# Patient Record
Sex: FEMALE | Race: OTHER | HISPANIC OR LATINO
[De-identification: names, ages, dates, MRNs, and addresses within clinical notes are randomized per-mention and may not be internally consistent; named-entity substitution may affect disease eponyms.]

---

## 2017-02-12 ENCOUNTER — FORM ENCOUNTER (OUTPATIENT)
Age: 52
End: 2017-02-12

## 2017-11-14 PROBLEM — Z00.00 ENCOUNTER FOR PREVENTIVE HEALTH EXAMINATION: Status: ACTIVE | Noted: 2017-11-14

## 2017-11-28 ENCOUNTER — APPOINTMENT (OUTPATIENT)
Dept: ORTHOPEDIC SURGERY | Facility: CLINIC | Age: 52
End: 2017-11-28

## 2017-12-11 ENCOUNTER — APPOINTMENT (OUTPATIENT)
Dept: ORTHOPEDIC SURGERY | Facility: CLINIC | Age: 52
End: 2017-12-11

## 2017-12-11 ENCOUNTER — APPOINTMENT (OUTPATIENT)
Dept: ORTHOPEDIC SURGERY | Facility: CLINIC | Age: 52
End: 2017-12-11
Payer: COMMERCIAL

## 2017-12-11 VITALS — WEIGHT: 150 LBS | RESPIRATION RATE: 16 BRPM | BODY MASS INDEX: 25.61 KG/M2 | HEIGHT: 64 IN

## 2017-12-11 PROCEDURE — 76882 US LMTD JT/FCL EVL NVASC XTR: CPT | Mod: RT

## 2017-12-11 PROCEDURE — 99203 OFFICE O/P NEW LOW 30 MIN: CPT

## 2017-12-11 PROCEDURE — 73110 X-RAY EXAM OF WRIST: CPT | Mod: RT

## 2018-02-15 ENCOUNTER — FORM ENCOUNTER (OUTPATIENT)
Age: 53
End: 2018-02-15

## 2018-07-23 ENCOUNTER — APPOINTMENT (OUTPATIENT)
Dept: ORTHOPEDIC SURGERY | Facility: CLINIC | Age: 53
End: 2018-07-23
Payer: COMMERCIAL

## 2018-07-23 VITALS — BODY MASS INDEX: 25.61 KG/M2 | RESPIRATION RATE: 16 BRPM | HEIGHT: 64 IN | WEIGHT: 150 LBS

## 2018-07-23 PROCEDURE — 73070 X-RAY EXAM OF ELBOW: CPT | Mod: RT

## 2018-07-23 PROCEDURE — 20605 DRAIN/INJ JOINT/BURSA W/O US: CPT | Mod: RT

## 2018-07-23 PROCEDURE — 99214 OFFICE O/P EST MOD 30 MIN: CPT | Mod: 25

## 2018-09-08 ENCOUNTER — EMERGENCY (EMERGENCY)
Facility: HOSPITAL | Age: 53
LOS: 1 days | Discharge: HOME | End: 2018-09-08
Attending: EMERGENCY MEDICINE | Admitting: EMERGENCY MEDICINE
Payer: COMMERCIAL

## 2018-09-08 VITALS
TEMPERATURE: 98 F | OXYGEN SATURATION: 99 % | HEART RATE: 72 BPM | SYSTOLIC BLOOD PRESSURE: 138 MMHG | DIASTOLIC BLOOD PRESSURE: 83 MMHG | WEIGHT: 153.66 LBS | HEIGHT: 64 IN | RESPIRATION RATE: 18 BRPM

## 2018-09-08 VITALS
OXYGEN SATURATION: 98 % | RESPIRATION RATE: 18 BRPM | DIASTOLIC BLOOD PRESSURE: 68 MMHG | TEMPERATURE: 98 F | SYSTOLIC BLOOD PRESSURE: 101 MMHG | HEART RATE: 79 BPM

## 2018-09-08 LAB
ANION GAP SERPL CALC-SCNC: 15 MMOL/L — SIGNIFICANT CHANGE UP (ref 5–17)
BASOPHILS NFR BLD AUTO: 0.2 % — SIGNIFICANT CHANGE UP (ref 0–2)
BUN SERPL-MCNC: 12 MG/DL — SIGNIFICANT CHANGE UP (ref 7–23)
CALCIUM SERPL-MCNC: 9.9 MG/DL — SIGNIFICANT CHANGE UP (ref 8.4–10.5)
CHLORIDE SERPL-SCNC: 102 MMOL/L — SIGNIFICANT CHANGE UP (ref 96–108)
CO2 SERPL-SCNC: 24 MMOL/L — SIGNIFICANT CHANGE UP (ref 22–31)
CREAT SERPL-MCNC: 0.9 MG/DL — SIGNIFICANT CHANGE UP (ref 0.5–1.3)
GLUCOSE SERPL-MCNC: 103 MG/DL — HIGH (ref 70–99)
HCT VFR BLD CALC: 42.9 % — SIGNIFICANT CHANGE UP (ref 34.5–45)
HGB BLD-MCNC: 14.6 G/DL — SIGNIFICANT CHANGE UP (ref 11.5–15.5)
LYMPHOCYTES # BLD AUTO: 16.7 % — SIGNIFICANT CHANGE UP (ref 13–44)
MCHC RBC-ENTMCNC: 30.3 PG — SIGNIFICANT CHANGE UP (ref 27–34)
MCHC RBC-ENTMCNC: 34 G/DL — SIGNIFICANT CHANGE UP (ref 32–36)
MCV RBC AUTO: 89 FL — SIGNIFICANT CHANGE UP (ref 80–100)
MONOCYTES NFR BLD AUTO: 5.4 % — SIGNIFICANT CHANGE UP (ref 2–14)
NEUTROPHILS NFR BLD AUTO: 77.7 % — HIGH (ref 43–77)
PLATELET # BLD AUTO: 247 K/UL — SIGNIFICANT CHANGE UP (ref 150–400)
POTASSIUM SERPL-MCNC: 3.9 MMOL/L — SIGNIFICANT CHANGE UP (ref 3.5–5.3)
POTASSIUM SERPL-SCNC: 3.9 MMOL/L — SIGNIFICANT CHANGE UP (ref 3.5–5.3)
RBC # BLD: 4.82 M/UL — SIGNIFICANT CHANGE UP (ref 3.8–5.2)
RBC # FLD: 13.2 % — SIGNIFICANT CHANGE UP (ref 10.3–16.9)
SODIUM SERPL-SCNC: 141 MMOL/L — SIGNIFICANT CHANGE UP (ref 135–145)
TROPONIN T SERPL-MCNC: <0.01 NG/ML — SIGNIFICANT CHANGE UP (ref 0–0.01)
WBC # BLD: 5.6 K/UL — SIGNIFICANT CHANGE UP (ref 3.8–10.5)
WBC # FLD AUTO: 5.6 K/UL — SIGNIFICANT CHANGE UP (ref 3.8–10.5)

## 2018-09-08 PROCEDURE — 82962 GLUCOSE BLOOD TEST: CPT

## 2018-09-08 PROCEDURE — 84484 ASSAY OF TROPONIN QUANT: CPT

## 2018-09-08 PROCEDURE — 93010 ELECTROCARDIOGRAM REPORT: CPT

## 2018-09-08 PROCEDURE — 85025 COMPLETE CBC W/AUTO DIFF WBC: CPT

## 2018-09-08 PROCEDURE — 96374 THER/PROPH/DIAG INJ IV PUSH: CPT

## 2018-09-08 PROCEDURE — 93005 ELECTROCARDIOGRAM TRACING: CPT

## 2018-09-08 PROCEDURE — 80048 BASIC METABOLIC PNL TOTAL CA: CPT

## 2018-09-08 PROCEDURE — 99284 EMERGENCY DEPT VISIT MOD MDM: CPT | Mod: 25

## 2018-09-08 PROCEDURE — 96361 HYDRATE IV INFUSION ADD-ON: CPT

## 2018-09-08 PROCEDURE — 96375 TX/PRO/DX INJ NEW DRUG ADDON: CPT

## 2018-09-08 RX ORDER — METOCLOPRAMIDE HCL 10 MG
10 TABLET ORAL ONCE
Qty: 0 | Refills: 0 | Status: COMPLETED | OUTPATIENT
Start: 2018-09-08 | End: 2018-09-08

## 2018-09-08 RX ORDER — KETOROLAC TROMETHAMINE 30 MG/ML
15 SYRINGE (ML) INJECTION ONCE
Qty: 0 | Refills: 0 | Status: DISCONTINUED | OUTPATIENT
Start: 2018-09-08 | End: 2018-09-08

## 2018-09-08 RX ORDER — SODIUM CHLORIDE 9 MG/ML
1000 INJECTION INTRAMUSCULAR; INTRAVENOUS; SUBCUTANEOUS ONCE
Qty: 0 | Refills: 0 | Status: COMPLETED | OUTPATIENT
Start: 2018-09-08 | End: 2018-09-08

## 2018-09-08 RX ORDER — ONDANSETRON 8 MG/1
1 TABLET, FILM COATED ORAL
Qty: 10 | Refills: 0 | OUTPATIENT
Start: 2018-09-08

## 2018-09-08 RX ORDER — DIPHENHYDRAMINE HCL 50 MG
25 CAPSULE ORAL ONCE
Qty: 0 | Refills: 0 | Status: COMPLETED | OUTPATIENT
Start: 2018-09-08 | End: 2018-09-08

## 2018-09-08 RX ORDER — MECLIZINE HCL 12.5 MG
1 TABLET ORAL
Qty: 20 | Refills: 0 | OUTPATIENT
Start: 2018-09-08

## 2018-09-08 RX ADMIN — SODIUM CHLORIDE 1000 MILLILITER(S): 9 INJECTION INTRAMUSCULAR; INTRAVENOUS; SUBCUTANEOUS at 18:00

## 2018-09-08 RX ADMIN — Medication 10 MILLIGRAM(S): at 16:51

## 2018-09-08 RX ADMIN — Medication 25 MILLIGRAM(S): at 16:50

## 2018-09-08 RX ADMIN — Medication 15 MILLIGRAM(S): at 17:59

## 2018-09-08 RX ADMIN — SODIUM CHLORIDE 2000 MILLILITER(S): 9 INJECTION INTRAMUSCULAR; INTRAVENOUS; SUBCUTANEOUS at 16:50

## 2018-09-08 RX ADMIN — Medication 15 MILLIGRAM(S): at 16:50

## 2018-09-08 NOTE — ED PROVIDER NOTE - MEDICAL DECISION MAKING DETAILS
suspect bppv, possible migraine.  no focal neuro findings except for lateral nystagmus.  labs wnl and neg for elevtrolyte imbalance/ acs.   given reglan/toradol/benadryl/ ivf with resolution of symptoms.  home with meclizine suspect bppv, possible migraine.  no focal neuro findings except for lateral nystagmus.  labs wnl and neg for electrolyte imbalance/ acs.   given reglan/toradol/benadryl/ ivf with resolution of symptoms.  home with meclizine

## 2018-09-08 NOTE — ED PROVIDER NOTE - ENMT, MLM
Airway patent, Nasal mucosa clear. Mouth with normal mucosa. Throat has no vesicles, no oropharyngeal exudates and uvula is midline.  Supple.  Tm normal

## 2018-09-08 NOTE — ED ADULT TRIAGE NOTE - CHIEF COMPLAINT QUOTE
Pt CO Dizziness x1 week with vomiting starting since Wednesday.  EKG and FSBG in progress.  PT denies SOB, Fevers, CP

## 2018-09-08 NOTE — ED PROVIDER NOTE - EYES, MLM
Clear bilaterally, pupils equal, round and reactive to light.  lateral nystagmus when looking to right

## 2018-09-08 NOTE — ED PROVIDER NOTE - OBJECTIVE STATEMENT
here with intermittent dizziness/ nausea/ vomiting associated with mild headache for past several days.  Says it feels like things are spinning/ moving around.  Tried dramamine without relief.  No fever/chills, chest pain, sob, urinary symptoms, abdominal pain.  Remembers something similar a few years ago.

## 2018-09-08 NOTE — ED ADULT NURSE REASSESSMENT NOTE - NS ED NURSE REASSESS COMMENT FT1
Patient a/oX 3, anxious, c/o of dizziness w/ nausea and headache, no vomitting since arrival to ED.  EKG NSR, vital signs stable.  Right AC PIV #20 in place, all labs sent, no complications.  Administered NSS 1 L bolus, REglan, Benadryl, Toradol IVP w/ good effects.  Stable and comfortable. REsults and disposition pending.

## 2018-09-08 NOTE — ED ADULT NURSE NOTE - OBJECTIVE STATEMENT
Patient c/o of intermittent dizziness and headache X 1 wk, with episodes of nausea and vomitting, states can feel room spinning, symptoms worsening.  No chest pain, no neuro deficits.  EKG NSR in ED.

## 2018-09-12 DIAGNOSIS — R11.2 NAUSEA WITH VOMITING, UNSPECIFIED: ICD-10-CM

## 2018-09-12 DIAGNOSIS — R42 DIZZINESS AND GIDDINESS: ICD-10-CM

## 2018-09-12 DIAGNOSIS — R51 HEADACHE: ICD-10-CM

## 2019-03-22 ENCOUNTER — FORM ENCOUNTER (OUTPATIENT)
Age: 54
End: 2019-03-22

## 2019-05-15 ENCOUNTER — APPOINTMENT (OUTPATIENT)
Dept: OTOLARYNGOLOGY | Facility: CLINIC | Age: 54
End: 2019-05-15
Payer: COMMERCIAL

## 2019-05-15 VITALS
HEART RATE: 85 BPM | BODY MASS INDEX: 26.47 KG/M2 | WEIGHT: 155.06 LBS | SYSTOLIC BLOOD PRESSURE: 123 MMHG | DIASTOLIC BLOOD PRESSURE: 80 MMHG | HEIGHT: 64 IN

## 2019-05-15 DIAGNOSIS — R22.31 LOCALIZED SWELLING, MASS AND LUMP, RIGHT UPPER LIMB: ICD-10-CM

## 2019-05-15 DIAGNOSIS — Z82.49 FAMILY HISTORY OF ISCHEMIC HEART DISEASE AND OTHER DISEASES OF THE CIRCULATORY SYSTEM: ICD-10-CM

## 2019-05-15 DIAGNOSIS — Z87.39 PERSONAL HISTORY OF OTHER DISEASES OF THE MUSCULOSKELETAL SYSTEM AND CONNECTIVE TISSUE: ICD-10-CM

## 2019-05-15 PROCEDURE — 31575 DIAGNOSTIC LARYNGOSCOPY: CPT

## 2019-05-15 PROCEDURE — 99204 OFFICE O/P NEW MOD 45 MIN: CPT | Mod: 25

## 2019-05-15 RX ORDER — BENZONATATE 200 MG/1
200 CAPSULE ORAL
Qty: 20 | Refills: 0 | Status: COMPLETED | COMMUNITY
Start: 2019-04-23

## 2019-05-15 RX ORDER — LEVOFLOXACIN 500 MG/1
500 TABLET, FILM COATED ORAL
Qty: 7 | Refills: 0 | Status: COMPLETED | COMMUNITY
Start: 2019-04-26

## 2019-05-15 NOTE — CONSULT LETTER
[Consult Closing:] : Thank you very much for allowing me to participate in the care of this patient.  If you have any questions, please do not hesitate to contact me. [Dear  ___] : Dear  [unfilled], [Consult Letter:] : I had the pleasure of evaluating your patient, [unfilled]. [Sincerely,] : Sincerely, [FreeTextEntry2] : Anant Dodd M.D.\par [address]\[place] [address]\par  [FreeTextEntry1] : \par \par Enclosed please find my office notes for May 15, 2019. \par \par  [FreeTextEntry3] : \par Alexia Croft MD \par Otolaryngology, Head and Neck Surgery \par \par

## 2019-05-15 NOTE — REVIEW OF SYSTEMS
[Patient Intake Form Reviewed] : Patient intake form was reviewed [Dry Eyes] : dry eyes [Eyes Itch] : itching of the eyes [Cough] : cough [Joint Pain] : joint pain [As Noted in HPI] : as noted in HPI [Negative] : Genitourinary [FreeTextEntry9] : arthritis, back pain

## 2019-05-15 NOTE — PROCEDURE
[de-identified] : \par Indication:  cough\par -Verbal consent was obtained from patient prior to procedure.\par -Don-Synephrine spray applied to the nasal cavities.\par Flexible laryngoscopy was performed via left nostril and revealed the following:\par   -- Nasopharynx had erythema and edema of small adenoid pad; no exudate\par   -- Base of tongue was symmetric and not enlarged.\par   -- Vallecula was clear.  Cobblestoning and erythema posterior pharyngeal wall. \par   -- Epiglottis, both aryepiglottic folds and both false vocal folds were normal\par   -- Arytenoids both with mild edema and no erythema \par   -- True vocal folds were fully mobile and without lesions. \par   -- Post cricoid area was clear.\par   -- Interarytenoid edema was present.\par \par The patient tolerated the procedure well.\par

## 2019-05-15 NOTE — PHYSICAL EXAM
[Midline] : trachea located in midline position [Laryngoscopy Performed] : laryngoscopy was performed, see procedure section for findings [Normal] : no rashes [de-identified] : Thyroid gland diffusely mildly puffy, nontender. [FreeTextEntry1] : No hoarseness. [de-identified] : Carotid pulses 2+ bilateral.

## 2019-05-15 NOTE — ASSESSMENT
[FreeTextEntry1] : Ms. Mathis was evaluated for the following issues today:\par \par 1.) goiter with sub-cm nodule/cyst in left lobe\par None of the nodules are suspicious for neoplasm and do not meet criteria for FNA at this time.\par 2.) cough and postnasal drip seems to be from residual adenoiditis\par She was treated with levofloxacin at end of April.\par \par PLAN:\par - repeat US thyroid in 3-6 months, as ordered by Dr. Dodd\par - fluticasone nasal spray daily for a few weeks.\par \par Return PRN

## 2020-01-08 NOTE — ED PROVIDER NOTE - CROS ED ROS STATEMENT
central line located in the superior vena cava/7cm/depth of insertion
chest radiograph/depth of insertion/line adjusted to depth of insertion
all other ROS negative except as per HPI

## 2020-06-07 ENCOUNTER — FORM ENCOUNTER (OUTPATIENT)
Age: 55
End: 2020-06-07

## 2020-07-09 ENCOUNTER — APPOINTMENT (OUTPATIENT)
Dept: OTOLARYNGOLOGY | Facility: CLINIC | Age: 55
End: 2020-07-09
Payer: COMMERCIAL

## 2020-07-09 VITALS
SYSTOLIC BLOOD PRESSURE: 131 MMHG | BODY MASS INDEX: 28.34 KG/M2 | DIASTOLIC BLOOD PRESSURE: 83 MMHG | HEART RATE: 103 BPM | WEIGHT: 166 LBS | HEIGHT: 64 IN | TEMPERATURE: 96.7 F

## 2020-07-09 DIAGNOSIS — R05 COUGH: ICD-10-CM

## 2020-07-09 DIAGNOSIS — Z87.09 PERSONAL HISTORY OF OTHER DISEASES OF THE RESPIRATORY SYSTEM: ICD-10-CM

## 2020-07-09 PROCEDURE — 31575 DIAGNOSTIC LARYNGOSCOPY: CPT

## 2020-07-09 PROCEDURE — 99214 OFFICE O/P EST MOD 30 MIN: CPT | Mod: 25

## 2020-07-09 RX ORDER — FLUTICASONE PROPIONATE 50 UG/1
50 SPRAY, METERED NASAL DAILY
Qty: 1 | Refills: 3 | Status: DISCONTINUED | COMMUNITY
Start: 2019-05-15 | End: 2020-07-09

## 2020-07-22 RX ORDER — FLUTICASONE PROPIONATE 50 UG/1
50 SPRAY, METERED NASAL DAILY
Qty: 3 | Refills: 1 | Status: ACTIVE | COMMUNITY
Start: 2020-07-22 | End: 1900-01-01

## 2020-07-22 NOTE — PROCEDURE
[de-identified] : \par Indication:  dysphagia\par -Verbal consent was obtained from patient prior to procedure.\par -Don-Synephrine spray applied to the nasal cavities.\par Flexible laryngoscopy was performed via right nostril and revealed the following:\par   -- Nasopharynx had thick white exudate streaming from right nasal cavity, inferior to inf turb.  No redness NP.  Cobblestoning and swelling of right posterior pharyngeal wall. \par   -- Base of tongue was symmetric and not enlarged.\par   -- Vallecula was clear.  \par   -- Epiglottis, both aryepiglottic folds and both false vocal folds were normal\par   -- Arytenoids both with mild edema and erythema \par   -- True vocal folds were fully mobile and without lesions. \par   -- Post cricoid area was congested\par   -- Interarytenoid edema was present.\par \par The patient tolerated the procedure well.\par

## 2020-07-22 NOTE — CONSULT LETTER
[Dear  ___] : Dear  [unfilled], [Courtesy Letter:] : I had the pleasure of seeing your patient, [unfilled], in my office today. [Consult Closing:] : Thank you very much for allowing me to participate in the care of this patient.  If you have any questions, please do not hesitate to contact me. [Sincerely,] : Sincerely, [FreeTextEntry1] : \par \par Enclosed please find my office notes for July 9, 2020.\par \par  [FreeTextEntry2] : Anant Dodd M.D.\par [address]\[place] [address]\par  [FreeTextEntry3] : \par Alexia Croft MD \par Otolaryngology, Head and Neck Surgery \par \par   [___] : [unfilled]

## 2020-07-22 NOTE — ASSESSMENT
[FreeTextEntry1] : Ms. Mathis was evaluated for the following issues today:\par \par 1.) goiter seems larger since last year.  Thyroid may not be large enough to cause her throat symptoms.\par None of the prior nodules were suspicious for neoplasm and did not meet criteria for FNA last year\par 2.) dysphagia from postnasal drip and sinusitis\par \par PLAN:\par - repeat US thyroid \par - fluticasone nasal spray daily \par - antibiotics for sinusitis\par \par Return 1 month via TH visit

## 2020-07-22 NOTE — PHYSICAL EXAM
[FreeTextEntry1] : No hoarseness. [de-identified] : Thyroid gland diffusely mildly puffy, nontender; left lobe seems a little larger. [de-identified] : inferior turbinate hypertrophy bilateral  [Midline] : trachea located in midline position [Laryngoscopy Performed] : laryngoscopy was performed, see procedure section for findings [Normal] : orientation to person, place, and time: normal [de-identified] : Carotid pulses 2+ bilateral.  [de-identified] : Chester sign is negative.

## 2020-07-22 NOTE — HISTORY OF PRESENT ILLNESS
[de-identified] : Ms. Mathis was seen in f/up today.\par She was accompanied by her , who also contributed to history. \par \par She was noted to have thyroid enlargement last year.  She thinks thyroid may be a little larger now.\par She denies neck pain/pressure, throat pain or voice change.\par Over the past month, she has prominent intermittent but frequent FB sensation in throat and feels that solids sometimes do not go down easily. She has no heartburn.\par No FH of thyroid problems.\par No radiation other than routine imaging.\par She is euthyroid.\par \par She feels postnasal drip x last 2 weeks.  \par Also has 2 months of left nasal congestion and crusting.  Denies allergic rhinitis.\par \par \par US THYROID (03-) at [facility]:\par - COMPARISON:  None. \par - ISTHMUS:  0.4 cm.\par - RIGHT LOBE:  5.2 cm x 1.4 cm  x 2.2 cm, slightly heterogeneous without discrete nodule. \par - LEFT LOBE:   4.9 cm  x 1.8 cm x 2.0 cm \par    --  0.5 x 0.3 x 0.5 cm complex cystic and solid nodule in the upper pole\par    --  0.4 cm cyst in the midpole\par \par

## 2020-07-22 NOTE — ADDENDUM
[FreeTextEntry1] : \par US THYROID (7/20/2020) at [facility]:\par - Comparison with prior US 3/23/2019\par - ISTHMUS 0.4 cm\par - RIGHT LOBE :  5.6 x .3 x 1.9 cm, relatively homogeneous with no nodule\par - LEFT LOBE:  5.6 x 1.7 x 1.6 cm, heterogeneous with nodular pattern, slightly vascular\par    --- 0.5 x 0.4 x 0.5 cm complex cystic nodule in upper pole; stable size\par    --- 0.3 x 0.3 x 0.3 cm complex nodule in upper pole; stable size\par    --- 0.5 x 0.3 x 0.6 cm hypoechoic nodule in mid pole; not seen on prior study\par \par (No indication for FNA thyroid nodules)\par \par

## 2020-08-19 ENCOUNTER — APPOINTMENT (OUTPATIENT)
Dept: OTOLARYNGOLOGY | Facility: CLINIC | Age: 55
End: 2020-08-19
Payer: COMMERCIAL

## 2020-08-19 DIAGNOSIS — J32.8 OTHER CHRONIC SINUSITIS: ICD-10-CM

## 2020-08-19 DIAGNOSIS — J01.80 OTHER ACUTE SINUSITIS: ICD-10-CM

## 2020-08-19 PROCEDURE — 99213 OFFICE O/P EST LOW 20 MIN: CPT | Mod: 95

## 2020-08-19 RX ORDER — AMOXICILLIN AND CLAVULANATE POTASSIUM 875; 125 MG/1; MG/1
875-125 TABLET, COATED ORAL
Qty: 20 | Refills: 0 | Status: COMPLETED | COMMUNITY
Start: 2020-07-09 | End: 2020-07-19

## 2020-08-19 NOTE — HISTORY OF PRESENT ILLNESS
[de-identified] : TELEHEALTH VISIT\par  Visit initiated at patient request. This audio / visual (using Visual[employer]cript) visit is occurring during the  state of emergency due to COVID-19. Governmental regulation is restricting travel, in-person contact, recommend use of remote activities and telemedicine whenever possible. I discussed with patient the limitations of telemedicine encounters, including risks associated with the technology platform, technical difficulties, data security, and a limited physical exam. There is also a limitation in performing diagnostic procedures and patient may need further testing and workup to arrive at a diagnosis and treatment plan. We discussed this will be billed as a visit. \par Ms Mathis understood and elected to proceed at 6:40 pm on 08/19/2020.\par Patient location: Home  in [place], [place].  Patient was the only participant.\par Physician location:  Office of Dr. Croft at [address] [place] [address] in [place], [place]\par ----------------------------------------------------------------------------------------\par ----------------------------------------------------------------------------------------\par  Ms. Mathis had US thyroid to check on size and nodules.\par She thought that the gland may have grown a little larger.\par She still has frequent FB sensation in throat and feels that solids can get stuck in the lower throat. Sometimes coughs after drinking liquids.\par No heartburn, voice change or pain with swallowing.\par No FH of thyroid problems.  No radiation other than routine imaging.  She is euthyroid.\par \par She still feels postnasal drip x over 2 months.     No change after took antibiotics for sinusitis\par Still reports intermittent left nasal congestion and crusting over past 3 months.  No facial pain/pressure.\par Taking fluticasone nasal spray \par \par \par US THYROID (7/20/2020) at [facility]:\par - Comparison with prior US 3/23/2019\par - ISTHMUS 0.4 cm\par - RIGHT LOBE : 5.6 x 1.3 x 1.9 cm, relatively homogeneous with no nodule\par - LEFT LOBE: 5.6 x 1.7 x 1.6 cm, heterogeneous with nodular pattern, slightly vascular\par  --- 0.5 x 0.4 x 0.5 cm complex cystic nodule in upper pole; stable size\par  --- 0.3 x 0.3 x 0.3 cm complex nodule in upper pole; stable size\par  --- 0.5 x 0.3 x 0.6 cm hypoechoic nodule in mid pole; not seen on prior study\par \par \par US THYROID (03-) at [facility]:\par - COMPARISON:  None. \par - ISTHMUS:  0.4 cm.\par - RIGHT LOBE:  5.2 cm x 1.4 cm  x 2.2 cm, slightly heterogeneous without discrete nodule. \par - LEFT LOBE:   4.9 cm  x 1.8 cm x 2.0 cm \par    --  0.5 x 0.3 x 0.5 cm complex cystic and solid nodule in the upper pole\par    --  0.4 cm cyst in the midpole\par \par

## 2020-08-19 NOTE — PHYSICAL EXAM
[Normal] : no masses and lesions seen, face is symmetric [de-identified] : Thyroid gland mildly puffy. [FreeTextEntry1] : No hoarseness.

## 2020-08-19 NOTE — ASSESSMENT
[FreeTextEntry1] : Ms. Mathis was evaluated for the following issues today:\par \par 1.)  multinodular goiter -  Thyroid size is only a little longer in length in both lobes on recent US, compared with 2019.  None of the left lobe (only) nodules have suspicious features or are of size to warrant biopsy.\par 2.) dysphagia - no change after treatment of acute sinusitis\par 3.) postnasal drip and left nasal congestion - no change after 10 days antibiotics for acute sinusitis\par \par PLAN:\par - No indication for FNA thyroid nodules at this time\par - recommend repeat US thyroid in 1 year\par - continue fluticasone nasal spray daily \par - modified barium swallow \par \par Return 1 month via office visit\par

## 2020-08-19 NOTE — CONSULT LETTER
[Dear  ___] : Dear  [unfilled], [Sincerely,] : Sincerely, [Courtesy Letter:] : I had the pleasure of seeing your patient, [unfilled], in my office today. [Consult Closing:] : Thank you very much for allowing me to participate in the care of this patient.  If you have any questions, please do not hesitate to contact me. [Please see my note below.] : Please see my note below. [FreeTextEntry2] : Anant Dodd M.D.\par [address]\[place] [address]\par  [FreeTextEntry1] : \par \par Enclosed please find my office notes for August 19, 2020.\par \par  [FreeTextEntry3] : \par Alexia Croft MD \par Otolaryngology, Head and Neck Surgery \par \par   [___] : [unfilled]

## 2020-09-08 ENCOUNTER — FORM ENCOUNTER (OUTPATIENT)
Age: 55
End: 2020-09-08

## 2020-12-09 ENCOUNTER — FORM ENCOUNTER (OUTPATIENT)
Age: 55
End: 2020-12-09

## 2021-02-05 ENCOUNTER — EMERGENCY (EMERGENCY)
Facility: HOSPITAL | Age: 56
LOS: 1 days | Discharge: HOME | End: 2021-02-05
Admitting: EMERGENCY MEDICINE
Payer: COMMERCIAL

## 2021-02-05 VITALS
SYSTOLIC BLOOD PRESSURE: 133 MMHG | HEART RATE: 100 BPM | WEIGHT: 154.98 LBS | DIASTOLIC BLOOD PRESSURE: 88 MMHG | OXYGEN SATURATION: 97 % | TEMPERATURE: 99 F | HEIGHT: 64 IN | RESPIRATION RATE: 18 BRPM

## 2021-02-05 DIAGNOSIS — M25.552 PAIN IN LEFT HIP: ICD-10-CM

## 2021-02-05 PROCEDURE — 73502 X-RAY EXAM HIP UNI 2-3 VIEWS: CPT

## 2021-02-05 PROCEDURE — 73502 X-RAY EXAM HIP UNI 2-3 VIEWS: CPT | Mod: 26,LT

## 2021-02-05 PROCEDURE — 99284 EMERGENCY DEPT VISIT MOD MDM: CPT

## 2021-02-05 PROCEDURE — 96372 THER/PROPH/DIAG INJ SC/IM: CPT

## 2021-02-05 PROCEDURE — 99283 EMERGENCY DEPT VISIT LOW MDM: CPT | Mod: 25

## 2021-02-05 RX ORDER — LIDOCAINE 4 G/100G
1 CREAM TOPICAL ONCE
Refills: 0 | Status: COMPLETED | OUTPATIENT
Start: 2021-02-05 | End: 2021-02-05

## 2021-02-05 RX ORDER — KETOROLAC TROMETHAMINE 30 MG/ML
60 SYRINGE (ML) INJECTION ONCE
Refills: 0 | Status: DISCONTINUED | OUTPATIENT
Start: 2021-02-05 | End: 2021-02-05

## 2021-02-05 RX ORDER — LIDOCAINE 4 G/100G
1 CREAM TOPICAL
Qty: 1 | Refills: 0
Start: 2021-02-05 | End: 2021-02-05

## 2021-02-05 RX ORDER — IBUPROFEN 200 MG
1 TABLET ORAL
Qty: 15 | Refills: 0
Start: 2021-02-05 | End: 2021-02-09

## 2021-02-05 RX ADMIN — LIDOCAINE 1 PATCH: 4 CREAM TOPICAL at 12:57

## 2021-02-05 RX ADMIN — Medication 60 MILLIGRAM(S): at 12:58

## 2021-02-05 NOTE — ED PROVIDER NOTE - PATIENT PORTAL LINK FT
You can access the FollowMyHealth Patient Portal offered by [facility] by registering at the following website: [email or web address]. By joining [employer]’s FollowMyHealth portal, you will also be able to view your health information using other applications (apps) compatible with our system.

## 2021-02-05 NOTE — ED PROVIDER NOTE - CARE PROVIDER_API CALL
Tico Montes De Oca)  Orthopaedic Surgery; Sports Medicine  [address] [place] [address]  Phone: [phone]  Fax: [phone]  Follow Up Time:

## 2021-02-05 NOTE — ED PROVIDER NOTE - CLINICAL SUMMARY MEDICAL DECISION MAKING FREE TEXT BOX
56 y/o F presents c/o L hip pain x1 wk w/ pain located posteriorly and radiates into the groin. Pt reports that she fell x2 wks ago slip and fall at home and landed on L hip. Did not have any pain at this time of initial fall. Notes pain usually worse in the morning and becomes better during the day. Took 400 mg Motrin w/o relief. Denies back pain, numbness/tingling, incontinence, fever, chills or any other acute sx. ECG-- NSR at 65 bpm, QT's- 468. TWI- AVR 54 y/o F presents c/o L hip pain x1 wk w/ pain located posteriorly and radiates into the groin. Pt reports that she fell x2 wks ago slip and fall at home and landed on L hip. Did not have any pain at this time of initial fall. Notes pain usually worse in the morning and becomes better during the day. Took 400 mg Motrin w/o relief. Denies back pain, numbness/tingling, incontinence, fever, chills or any other acute sx. XR neg. Will f/u with pmd 11.4   7.38  )-----------( 249      ( 26 Sep 2019 23:17 )             35.8         138  |  101  |  20  ----------------------------<  178<H>  3.2<L>   |  25  |  0.99    Ca    9.0      26 Sep 2019 23:17  Mg     2.0         TPro  6.8  /  Alb  3.6  /  TBili  0.3  /  DBili  x   /  AST  16  /  ALT  11  /  AlkPhos  81      PT/INR - ( 26 Sep 2019 23:17 )   PT: 11.4 SEC;   INR: 1.03     PTT - ( 26 Sep 2019 23:17 )  PTT:32.8 SEC    Urinalysis Basic - ( 27 Sep 2019 02:30 )  Color: LIGHT YELLOW / Appearance: CLEAR / S.013 / pH: 7.0  Gluc: NEGATIVE / Ketone: NEGATIVE  / Bili: NEGATIVE / Urobili: NORMAL   Blood: NEGATIVE / Protein: NEGATIVE / Nitrite: POSITIVE   Leuk Esterase: SMALL / RBC: 0-2 / WBC 11-25   Sq Epi: OCC / Non Sq Epi: x / Bacteria: MODERATE    Troponin T, High Sensitivity: 13 ng/L (19 @ 23:17)    Serum Pro-Brain Natriuretic Peptide: 42.82 pg/mL (19 @ 23:17)    < from: CT Head No Cont (19 @ 23:57) >/< from: CT Cervical Spine No Cont (19 @ 23:57) >  CT BRAIN: There is no acute intracranial mass-effect, hemorrhage, midline shift, or abnormal extra-axial fluid collection. Periventricular white vascular ischemic changes. Lacunar infarcts in the left basal ganglia and corona radiata. Ventricles, sulci, and cisterns are normal in size for the patient's age without evidence of hydrocephalus. Basal cisterns are patent. Paranasal sinuses and mastoid air cells are clear. The calvarium is   intact.   CT CERVICAL SPINE: Reversal of the cervical lordosis due to muscle spasm and/or positioning. There are moderate multilevel degenerative changes of the cervical spine, represented by disc space narrowing, disc bulges, disc-osteophyte complexes, ligamentum flavum hypertrophy and facet hypertrophy. These findings contribute to multilevel neural foraminal and spinal canal narrowing, the overall degree of which is not well-demonstrated on this study. There is no prevertebral soft tissue swelling. Vertebral body heights and alignment are maintained without compression deformity or subluxation. The atlantodental and atlanto-occipital joints are maintained. Articular facets and posterior elements alignment is maintained. The partially imaged lung apices are clear.   IMPRESSION:   CT BRAIN: No acute intracranial bleeding, mass effect, or shift.   CT CERVICAL SPINE: No fracture or subluxation. Reversal of the cervical lordosis  < end of copied text >    < from: CT Abdomen and Pelvis No Cont (19 @ 23:58) >  LUNGS AND LARGE AIRWAYS: Patent central airways. No pulmonary nodules.  PLEURA: No pleural effusion.  VESSELS: Calcified aortic and coronary atherosclerosis.  HEART: Cardiomegaly. No pericardial effusion.  MEDIASTINUM AND LUCAS: No lymphadenopathy.  CHEST WALL AND LOWER NECK: Within normal limits.  ABDOMEN AND PELVIS:  LIVER: Chest granuloma in the liver..  BILE DUCTS: Normal caliber.  GALLBLADDER: Within normal limits.  SPLEEN: Within normal limits.  PANCREAS: Within normal limits.  ADRENALS: Within normal limits.  KIDNEYS/URETERS: Left renal cyst. No hydroureteronephrosis or obstructing renal calculi.  BLADDER: Within normal limits.  REPRODUCTIVE ORGANS: Myomatous uterus  BOWEL: No bowel obstruction.   PERITONEUM: No ascites.  VESSELS: Within normal limits.  RETROPERITONEUM/LYMPH NODES: No lymphadenopathy.    ABDOMINAL WALL: Within normal limits.  BONES: No acute fracture. Spinal degenerative changes.  IMPRESSION: No traumatic sequela  < end of copied text >    < from: Xray Chest 1 View-PORTABLE IMMEDIATE (19 @ 23:38) >  Cardiac silhouette is not accurately evaluated on this projection. Lungs are clear. No pleural effusions or pneumothorax. No acute osseous abnormality.   IMPRESSION: Clear lungs.  < end of copied text >    ECG-- NSR at 65 bpm, QT's- 468. TWI- AVR 11.4   7.38  )-----------( 249      ( 26 Sep 2019 23:17 )             35.8         138  |  101  |  20  ----------------------------<  178<H>  3.2<L>   |  25  |  0.99    Ca    9.0      26 Sep 2019 23:17  Mg     2.0         TPro  6.8  /  Alb  3.6  /  TBili  0.3  /  DBili  x   /  AST  16  /  ALT  11  /  AlkPhos  81      PT/INR - ( 26 Sep 2019 23:17 )   PT: 11.4 SEC;   INR: 1.03     PTT - ( 26 Sep 2019 23:17 )  PTT:32.8 SEC    Urinalysis Basic - ( 27 Sep 2019 02:30 )  Color: LIGHT YELLOW / Appearance: CLEAR / S.013 / pH: 7.0  Gluc: NEGATIVE / Ketone: NEGATIVE  / Bili: NEGATIVE / Urobili: NORMAL   Blood: NEGATIVE / Protein: NEGATIVE / Nitrite: POSITIVE   Leuk Esterase: SMALL / RBC: 0-2 / WBC 11-25   Sq Epi: OCC / Non Sq Epi: x / Bacteria: MODERATE    Troponin T, High Sensitivity: 13 ng/L (19 @ 23:17)    Serum Pro-Brain Natriuretic Peptide: 42.82 pg/mL (19 @ 23:17)    < from: CT Head No Cont (19 @ 23:57) >/< from: CT Cervical Spine No Cont (19 @ 23:57) >  CT BRAIN: There is no acute intracranial mass-effect, hemorrhage, midline shift, or abnormal extra-axial fluid collection. Periventricular white vascular ischemic changes. Lacunar infarcts in the left basal ganglia and corona radiata. Ventricles, sulci, and cisterns are normal in size for the patient's age without evidence of hydrocephalus. Basal cisterns are patent. Paranasal sinuses and mastoid air cells are clear. The calvarium is   intact.   CT CERVICAL SPINE: Reversal of the cervical lordosis due to muscle spasm and/or positioning. There are moderate multilevel degenerative changes of the cervical spine, represented by disc space narrowing, disc bulges, disc-osteophyte complexes, ligamentum flavum hypertrophy and facet hypertrophy. These findings contribute to multilevel neural foraminal and spinal canal narrowing, the overall degree of which is not well-demonstrated on this study. There is no prevertebral soft tissue swelling. Vertebral body heights and alignment are maintained without compression deformity or subluxation. The atlantodental and atlanto-occipital joints are maintained. Articular facets and posterior elements alignment is maintained. The partially imaged lung apices are clear.   IMPRESSION:   CT BRAIN: No acute intracranial bleeding, mass effect, or shift.   CT CERVICAL SPINE: No fracture or subluxation. Reversal of the cervical lordosis  < end of copied text >    < from: CT Abdomen and Pelvis No Cont (19 @ 23:58) >  LUNGS AND LARGE AIRWAYS: Patent central airways. No pulmonary nodules.  PLEURA: No pleural effusion.  VESSELS: Calcified aortic and coronary atherosclerosis.  HEART: Cardiomegaly. No pericardial effusion.  MEDIASTINUM AND LUCAS: No lymphadenopathy.  CHEST WALL AND LOWER NECK: Within normal limits.  ABDOMEN AND PELVIS:  LIVER: Chest granuloma in the liver..  BILE DUCTS: Normal caliber.  GALLBLADDER: Within normal limits.  SPLEEN: Within normal limits.  PANCREAS: Within normal limits.  ADRENALS: Within normal limits.  KIDNEYS/URETERS: Left renal cyst. No hydroureteronephrosis or obstructing renal calculi.  BLADDER: Within normal limits.  REPRODUCTIVE ORGANS: Myomatous uterus  BOWEL: No bowel obstruction.   PERITONEUM: No ascites.  VESSELS: Within normal limits.  RETROPERITONEUM/LYMPH NODES: No lymphadenopathy.    ABDOMINAL WALL: Within normal limits.  BONES: No acute fracture. Spinal degenerative changes.  IMPRESSION: No traumatic sequela  < end of copied text >    < from: Xray Chest 1 View-PORTABLE IMMEDIATE (19 @ 23:38) >  Cardiac silhouette is not accurately evaluated on this projection. Lungs are clear. No pleural effusions or pneumothorax. No acute osseous abnormality.   IMPRESSION: Clear lungs.  < end of copied text >    ECG- personally reviewed - 65bpm NSR, 1st degree AVB; QTc 468ms

## 2021-02-05 NOTE — ED PROVIDER NOTE - PHYSICAL EXAMINATION
CONSTITUTIONAL: Well-appearing; well-nourished; in no apparent distress.   HEAD: Normocephalic; atraumatic.   EYES: PERRL; EOM intact; conjunctiva and sclera clear  ENT: normal nose; no rhinorrhea; normal pharynx with no erythema or lesions.   NECK: Supple; non-tender.   CARDIOVASCULAR: Normal S1, S2; no murmurs, rubs, or gallops. Regular rate and rhythm.   RESPIRATORY: Breathing easily; breath sounds clear and equal bilaterally; no wheezes, rhonchi, or rales.  MSK: tenderness to posterior lateral hip. No deformity. Full ROM . Strength 5/5.   EXT: No cyanosis or edema; N/V intact  SKIN: Normal for age and race; warm; dry; good turgor; no apparent lesions or rash. CONSTITUTIONAL: Well-appearing; well-nourished; in no apparent distress.   HEAD: Normocephalic; atraumatic.   NECK: Supple; non-tender.   CARDIOVASCULAR: Normal S1, S2; no murmurs, rubs, or gallops. Regular rate and rhythm.   RESPIRATORY: Breathing easily; breath sounds clear and equal bilaterally; no wheezes, rhonchi, or rales.  MSK: tenderness to posterior lateral hip. No deformity. Full ROM . Strength 5/5.   EXT: No cyanosis or edema; N/V intact  SKIN: Normal for age and race; warm; dry; good turgor; no apparent lesions or rash.

## 2021-02-05 NOTE — ED ADULT NURSE NOTE - NSIMPLEMENTINTERV_GEN_ALL_ED
Implemented All Fall Risk Interventions:  [place] to call system. Call bell, personal items and telephone within reach. Instruct patient to call for assistance. Room bathroom lighting operational. Non-slip footwear when patient is off stretcher. Physically safe environment: no spills, clutter or unnecessary equipment. Stretcher in lowest position, wheels locked, appropriate side rails in place. Provide visual cue, wrist band, yellow gown, etc. Monitor gait and stability. Monitor for mental status changes and reorient to person, place, and time. Review medications for side effects contributing to fall risk. Reinforce activity limits and safety measures with patient and family.

## 2021-02-05 NOTE — ED PROVIDER NOTE - OBJECTIVE STATEMENT
56 y/o F presents c/o L hip pain x1 wk w/ pain located posteriorly and radiates into the groin. Pt reports that she fell x2 wks ago slip and fall at home and landed on L hip. Did not have any pain at this time of initial fall. Notes pain usually worse in the morning and becomes better during the day. Took 400 mg Motrin w/o relief. Denies back pain, neck pain, numbness/tingling, incontinence, fever, chills or any other acute sx.

## 2021-07-14 ENCOUNTER — APPOINTMENT (OUTPATIENT)
Dept: BREAST CENTER | Facility: CLINIC | Age: 56
End: 2021-07-14

## 2021-11-08 NOTE — REVIEW OF SYSTEMS
Ronnie HECTOR Mccarthy presents to Urgent Care Patient arriving with: alone with complaint of cough, chills, diarrhea once yesterday none today. Runny nose and shortness of breath. Sore throat. Body aches and fatigue.    Onset of symptoms: Saturday symptoms began had 3rd covid vaccine on Friday the 5th.      Patient and visitor wearing mask? Yes    Myself full PPE    Can leave detailed message on   mobile phone:    Mobile [phone]        [As Noted in HPI] : as noted in HPI [Negative] : Heme/Lymph

## 2022-03-22 NOTE — ED PROVIDER NOTE - NS ED MD DISPO DISCHARGE CCDA
Patient has appt. not until April for her hands with [employer]. Patient asked if she could be put in sooner and the the  told her will need to send a new referral about her hands or describing them  to get her in sooner.    Patient/Caregiver provided printed discharge information.

## 2022-04-13 ENCOUNTER — APPOINTMENT (OUTPATIENT)
Dept: OTOLARYNGOLOGY | Facility: CLINIC | Age: 57
End: 2022-04-13
Payer: COMMERCIAL

## 2022-04-13 VITALS
HEIGHT: 64 IN | HEART RATE: 90 BPM | DIASTOLIC BLOOD PRESSURE: 80 MMHG | BODY MASS INDEX: 26.46 KG/M2 | TEMPERATURE: 98.1 F | WEIGHT: 155 LBS | SYSTOLIC BLOOD PRESSURE: 135 MMHG

## 2022-04-13 DIAGNOSIS — R13.14 DYSPHAGIA, PHARYNGOESOPHAGEAL PHASE: ICD-10-CM

## 2022-04-13 PROCEDURE — 99214 OFFICE O/P EST MOD 30 MIN: CPT | Mod: 25

## 2022-04-13 PROCEDURE — 31575 DIAGNOSTIC LARYNGOSCOPY: CPT

## 2022-05-24 ENCOUNTER — NON-APPOINTMENT (OUTPATIENT)
Age: 57
End: 2022-05-24

## 2022-05-24 PROBLEM — R13.14 DYSPHAGIA, PHARYNGOESOPHAGEAL: Status: RESOLVED | Noted: 2020-07-09 | Resolved: 2022-05-24

## 2022-05-24 NOTE — PROCEDURE
[de-identified] : \par Indication: reflux\par -Verbal consent was obtained from patient prior to procedure.\par Flexible laryngoscopy was performed via left nostril and revealed the following:\par   -- Nasopharynx had no mass or exudate. No cobblestoning posterior pharyngeal wall.\par   -- Base of tongue was symmetric and not enlarged. Lingual tonsils were normal.\par   -- Vallecula was clear\par   -- Epiglottis, both aryepiglottic folds and both false vocal folds were normal\par   -- Arytenoids both with moderate edema and minimal erythema \par   -- True vocal folds were fully mobile and without lesions. \par   -- Post cricoid area was clear.\par   -- Interarytenoid edema was present.\par   -- No lesion seen in laryngopharynx\par \par The patient tolerated the procedure well.\par

## 2022-05-24 NOTE — ASSESSMENT
[FreeTextEntry1] : Ms. BOONE was evaluated for the following issues today:\par \par 1.) Snoring\par --> sleep study to r/o CORTEZ\par --> try wedge pillow.  Try to sleep on her side\par \par 2.) Multinodular goiter without compressive symptoms - followed with serial US, last in 2020\par --> US thyroid\par \par 3.) Left nasal congestion, chronic, is related to left nasal valve collapse. Nasal septum is minimally deviated to the left. No change in breathing after decongestant spray. Relief of left nasal congestion with modified Nuzhat maneuver and direct elevation of upper lateral cartilage.\par --> Breath-rite strip\par --> Patient may benefit from left alar batten graft to relieve the nasal valve collapse\par \par 4.) Increased phlegm in the throat and chronic throat clearing x years; likely due to acid reflux\par --> famotidine QHS\par --> reflux precautions\par \par Return after US\par \par \par

## 2022-05-24 NOTE — HISTORY OF PRESENT ILLNESS
[de-identified] : Ms. BOONE presents for snoring.\par \par Her  noticed that she has been snoring for years and has gotten worse recently (her  is having her sleep in another room now).  No witnessed apneic episodes.  She doesn’t wake up tired. No recent weight gain.\par Has chronic left nasal congestion for 1 year. Sometimes has runny nose in the mornings and postnasal drip. \par She had some improvement of nasal sx with fluticasone nasal spray.\par No allergies. No pets at home.\par \par She is having jaw cracking/popping since her last COVID shot. She opened her mouth and it was stuck open.\par She feels a lot of saliva in the back of the throat throughout the day and night She wakes up and has to clear her throat. No neck swelling or pain.   She gets thick saliva. No dysphagia to food.\par History of acid reflux and gastritis, currently under the care of a GI doctor but no EGD has been done. Currently on no reflux medications.\par She doesn’t feel a change in thyroid size. Last US thyroid July 2020 to follow multiple thyroid nodules.\par \par \par \par US THYROID (7/20/2020) at [facility]:\par - Comparison with prior US 3/23/2019\par - ISTHMUS 0.4 cm\par - RIGHT LOBE : 5.6 x 1.3 x 1.9 cm, relatively homogeneous with no nodule\par - LEFT LOBE: 5.6 x 1.7 x 1.6 cm, heterogeneous with nodular pattern, slightly vascular\par  --- 0.5 x 0.4 x 0.5 cm complex cystic nodule in upper pole; stable size\par  --- 0.3 x 0.3 x 0.3 cm complex nodule in upper pole; stable size\par  --- 0.5 x 0.3 x 0.6 cm hypoechoic nodule in mid pole; not seen on prior study\par \par \par US THYROID (03-) at [facility]:\par - COMPARISON: None. \par - ISTHMUS: 0.4 cm.\par - RIGHT LOBE: 5.2 cm x 1.4 cm x 2.2 cm, slightly heterogeneous without discrete nodule. \par - LEFT LOBE: 4.9 cm x 1.8 cm x 2.0 cm \par  -- 0.5 x 0.3 x 0.5 cm complex cystic and solid nodule in the upper pole\par  -- 0.4 cm cyst in the midpole\par

## 2022-05-24 NOTE — CONSULT LETTER
[Dear  ___] : Dear  [unfilled], [Courtesy Letter:] : I had the pleasure of seeing your patient, [unfilled], in my office today. [Please see my note below.] : Please see my note below. [Consult Closing:] : Thank you very much for allowing me to participate in the care of this patient.  If you have any questions, please do not hesitate to contact me. [Sincerely,] : Sincerely, [FreeTextEntry2] : Anant Dodd M.D.\par [address]\[place] [address]\par  [FreeTextEntry3] : \par Alexia Croft MD \par Otolaryngology, Head and Neck Surgery \par \par

## 2022-05-24 NOTE — END OF VISIT
[FreeTextEntry3] : All medical record entries made by the Scribe were at my, Dr. Alexia Croft, direction and personally dictated by me on 04/13/2022. I have reviewed the chart and agree that the record accurately reflects my personal performance of the history, physical exam, assessment and plan. I have also personally directed, reviewed, and agreed with the chart.

## 2022-05-24 NOTE — ADDENDUM
[FreeTextEntry1] : Documented by Starr Tavares acting as a scribe for Dr. Alexia Croft on 04/13/2022.

## 2022-05-24 NOTE — PHYSICAL EXAM
[] : septum deviated to the left [Midline] : trachea located in midline position [Laryngoscopy Performed] : laryngoscopy was performed, see procedure section for findings [Normal] : no neck adenopathy [FreeTextEntry1] : No hoarseness. [de-identified] : Thyroid gland mildly enlarged. [de-identified] : Mild left nasal valve collapse on deep inspiration. [de-identified] : Septal deviation is minimal [de-identified] : Mallampati 2 airway [de-identified] : 1+ bilaterally

## 2022-06-27 ENCOUNTER — APPOINTMENT (OUTPATIENT)
Dept: PULMONOLOGY | Facility: CLINIC | Age: 57
End: 2022-06-27

## 2022-07-22 ENCOUNTER — APPOINTMENT (OUTPATIENT)
Dept: OTOLARYNGOLOGY | Facility: CLINIC | Age: 57
End: 2022-07-22

## 2022-07-22 VITALS
DIASTOLIC BLOOD PRESSURE: 83 MMHG | HEIGHT: 64 IN | SYSTOLIC BLOOD PRESSURE: 126 MMHG | BODY MASS INDEX: 26.46 KG/M2 | HEART RATE: 84 BPM | TEMPERATURE: 97 F | WEIGHT: 155 LBS

## 2022-07-22 DIAGNOSIS — R68.89 OTHER GENERAL SYMPTOMS AND SIGNS: ICD-10-CM

## 2022-07-22 DIAGNOSIS — R09.81 NASAL CONGESTION: ICD-10-CM

## 2022-07-22 DIAGNOSIS — R29.898 OTHER SYMPTOMS AND SIGNS INVOLVING THE MUSCULOSKELETAL SYSTEM: ICD-10-CM

## 2022-07-22 DIAGNOSIS — J34.3 HYPERTROPHY OF NASAL TURBINATES: ICD-10-CM

## 2022-07-22 DIAGNOSIS — K21.9 GASTRO-ESOPHAGEAL REFLUX DISEASE W/OUT ESOPHAGITIS: ICD-10-CM

## 2022-07-22 DIAGNOSIS — M54.2 CERVICALGIA: ICD-10-CM

## 2022-07-22 PROCEDURE — 99213 OFFICE O/P EST LOW 20 MIN: CPT

## 2022-07-22 RX ORDER — FAMOTIDINE 20 MG/1
20 TABLET, FILM COATED ORAL
Qty: 30 | Refills: 2 | Status: COMPLETED | COMMUNITY
Start: 2022-04-13 | End: 2022-07-22

## 2022-07-24 PROBLEM — K21.9 GASTROESOPHAGEAL REFLUX DISEASE: Status: ACTIVE | Noted: 2022-04-13

## 2022-07-24 PROBLEM — R29.898 TMJ CLICK: Status: ACTIVE | Noted: 2022-07-24

## 2022-07-24 PROBLEM — R09.81 NASAL CONGESTION: Status: ACTIVE | Noted: 2020-07-09

## 2022-07-24 PROBLEM — J34.3 HYPERTROPHY OF INFERIOR NASAL TURBINATE: Status: ACTIVE | Noted: 2020-07-09

## 2022-07-24 PROBLEM — M54.2 NECK PAIN ON RIGHT SIDE: Status: ACTIVE | Noted: 2022-07-24

## 2022-07-24 PROBLEM — R68.89 CHRONIC THROAT CLEARING: Status: ACTIVE | Noted: 2022-05-24

## 2022-07-24 NOTE — PHYSICAL EXAM
[FreeTextEntry1] : No hoarseness.  [de-identified] : Loud audible click R>L, nontender  [de-identified] : Mild inferior turbinate hypertrophy  [Midline] : trachea located in midline position [de-identified] : Mallampati 2 airway [de-identified] : 1+ bilaterally [Normal] : no neck adenopathy

## 2022-07-24 NOTE — CONSULT LETTER
[Dear  ___] : Dear  [unfilled], [Courtesy Letter:] : I had the pleasure of seeing your patient, [unfilled], in my office today. [Please see my note below.] : Please see my note below. [Consult Closing:] : Thank you very much for allowing me to participate in the care of this patient.  If you have any questions, please do not hesitate to contact me. [Sincerely,] : Sincerely, [FreeTextEntry2] : Anant Dodd M.D.\par [address]\[place] [address]\par  [FreeTextEntry3] : \par Alexia Croft MD \par Otolaryngology, Head and Neck Surgery \par \par   [___] : [unfilled]

## 2022-07-24 NOTE — HISTORY OF PRESENT ILLNESS
[de-identified] : Ms. BOONE reports right neck discomfort over the past week.  Pain starts under right ear and radiates down her neck.\par She has continued jaw popping and cracking. She is interested in seeing a dentist who will get authorization from insurance for a .\par She is scheduled for repeat thyroid US ordered by endocrinologist on August 6..\par Her  is no longer complaining about her snoring.  She changed her sleeping position.  Her insurance does not cover in-lab sleep study. \par Her left nasal congestion is the same, no significant improvement from Breathr-Rite strips.\par Still having phlegm and throat clearing but some improvement with reflux precautions.  Did not take famotidine.\par At end of May, she had COVID infection (mild symptoms that included runny nose, fever, chills). She developed a cough after that. \par \par VISIT (04/13/2022)\par Ms. BOONE presents for snoring.\par Her  noticed that she has been snoring for years and has gotten worse recently (her  is having her sleep in another room now). No witnessed apneic episodes. She doesn’t wake up tired. No recent weight gain.\par Has chronic left nasal congestion for 1 year. Sometimes has runny nose in the mornings and postnasal drip. \par She had some improvement of nasal sx with fluticasone nasal spray.\par No allergies. No pets at home.\par \par She is having jaw cracking/popping since her last COVID shot. She opened her mouth and it was stuck open.\par She feels a lot of saliva in the back of the throat throughout the day and night She wakes up and has to clear her throat. No neck swelling or pain. She gets thick saliva. No dysphagia to food.\par History of acid reflux and gastritis, currently under the care of a GI doctor but no EGD has been done. Currently on no reflux medications.\par She doesn’t feel a change in thyroid size. Last US thyroid July 2020 to follow multiple thyroid nodules.\par \par \par US THYROID (5/11/2022) at [facility]\par  - Comparison: US 7/20/2020\par - ISTHMUS: 0.4 cm\par - RIGHT LOBE: 5.3 x 1.6 x 2.0 cm, slightly heterogeneous, with new nodule \par  --- 0.6 x 0.4 x 0.5 cm hypoechoic nodule with possible small microcalcifications in anterior upper pole\par - LEFT LOBE: 5 x 1.6 x 1.8 cm, heterogeneous\par  --- 0.4 x 0.4 x 0.2 cm complex nodule with solid and cystic components in upper pole; stable\par  --- 0.4 x 0.2 x 0.2 cm colloid cyst in upper pole\par \par \par US THYROID (7/20/2020) at [facility]:\par - Comparison with prior US 3/23/2019\par - ISTHMUS 0.4 cm\par - RIGHT LOBE : 5.6 x 1.3 x 1.9 cm, relatively homogeneous with no nodule\par - LEFT LOBE: 5.6 x 1.7 x 1.6 cm, heterogeneous with nodular pattern, slightly vascular\par  --- 0.5 x 0.4 x 0.5 cm complex cystic nodule in upper pole; stable size\par  --- 0.3 x 0.3 x 0.3 cm complex nodule in upper pole; stable size\par  --- 0.5 x 0.3 x 0.6 cm hypoechoic nodule in mid pole; not seen on prior study\par \par \par US THYROID (03-) at [facility]:\par - COMPARISON: None. \par - ISTHMUS: 0.4 cm.\par - RIGHT LOBE: 5.2 cm x 1.4 cm x 2.2 cm, slightly heterogeneous without discrete nodule. \par - LEFT LOBE: 4.9 cm x 1.8 cm x 2.0 cm \par  -- 0.5 x 0.3 x 0.5 cm complex cystic and solid nodule in the upper pole\par  -- 0.4 cm cyst in the midpole\par

## 2022-07-24 NOTE — END OF VISIT
[FreeTextEntry3] : All medical record entries made by the Scribe were at my, Dr. Alexia Croft, direction and personally dictated by me on 07/22/2022. I have reviewed the chart and agree that the record accurately reflects my personal performance of the history, physical exam, assessment and plan. I have also personally directed, reviewed, and agreed with the chart.  [Time Spent: ___ minutes] : I have spent [unfilled] minutes of time on the encounter.

## 2022-07-24 NOTE — ASSESSMENT
[FreeTextEntry1] : Ms. BOONE was evaluated for the following issues today:\par \par 1.) Right neck discomfort for the past week seems musculoskeletal, likely due to TMJ inflammation.\par --> Recommended seeing her dentist for nightguard \par \par 2.)  Snoring, r/o CORTEZ\par --> To schedule a home sleep study, which is approved by her insurance\par \par 3.) Throat clearing is improved on reflux precautions\par --> recommended famotidine at bedtime\par \par 4.) multinodular goiter, with no suspicious nodules\par Had US thyroid 5/2022.\par \par Return after sleep study.\par \par

## 2022-07-24 NOTE — ADDENDUM
[FreeTextEntry1] : Documented by Starr Tavares acting as a scribe for Dr. Alexia Croft on 07/22/2022.

## 2022-08-01 ENCOUNTER — OUTPATIENT (OUTPATIENT)
Dept: OUTPATIENT SERVICES | Facility: HOSPITAL | Age: 57
LOS: 1 days | End: 2022-08-01
Payer: COMMERCIAL

## 2022-08-01 ENCOUNTER — APPOINTMENT (OUTPATIENT)
Dept: SLEEP CENTER | Facility: HOME HEALTH | Age: 57
End: 2022-08-01

## 2022-08-01 PROCEDURE — 95800 SLP STDY UNATTENDED: CPT

## 2022-08-01 PROCEDURE — 95800 SLP STDY UNATTENDED: CPT | Mod: 26

## 2022-08-03 DIAGNOSIS — G47.33 OBSTRUCTIVE SLEEP APNEA (ADULT) (PEDIATRIC): ICD-10-CM

## 2022-08-30 ENCOUNTER — APPOINTMENT (OUTPATIENT)
Dept: ENDOCRINOLOGY | Facility: CLINIC | Age: 57
End: 2022-08-30

## 2022-08-30 ENCOUNTER — NON-APPOINTMENT (OUTPATIENT)
Age: 57
End: 2022-08-30

## 2022-08-30 ENCOUNTER — APPOINTMENT (OUTPATIENT)
Dept: PULMONOLOGY | Facility: CLINIC | Age: 57
End: 2022-08-30

## 2022-08-30 VITALS
WEIGHT: 167 LBS | HEIGHT: 64 IN | OXYGEN SATURATION: 98 % | BODY MASS INDEX: 28.51 KG/M2 | HEART RATE: 81 BPM | DIASTOLIC BLOOD PRESSURE: 69 MMHG | TEMPERATURE: 98.1 F | SYSTOLIC BLOOD PRESSURE: 104 MMHG

## 2022-08-30 DIAGNOSIS — Z86.39 PERSONAL HISTORY OF OTHER ENDOCRINE, NUTRITIONAL AND METABOLIC DISEASE: ICD-10-CM

## 2022-08-30 DIAGNOSIS — R73.03 PREDIABETES.: ICD-10-CM

## 2022-08-30 DIAGNOSIS — Z80.42 FAMILY HISTORY OF MALIGNANT NEOPLASM OF PROSTATE: ICD-10-CM

## 2022-08-30 PROCEDURE — 99204 OFFICE O/P NEW MOD 45 MIN: CPT

## 2022-08-30 NOTE — REASON FOR VISIT
[Initial Evaluation] : an initial evaluation [Thyroid nodule/ MNG] : thyroid nodule/ MNG [FreeTextEntry2] : Dr. Anant Dodd

## 2022-08-30 NOTE — ASSESSMENT
[FreeTextEntry1] : Patient is a 57 yo woman establishing endocrine care for thyroid nodules and prediabetes\par \par 1. Thyroid nodules\par -patient has had stable nodules with most recent thyroid US completed in August\par -the lab results were reviewed and scanned into records.  She had a thyroid US in May 2022 as well\par -there are three thyroid nodules that are all subcentimeter in size and do not meet criteria for FNA\par -she is both chemically and clinically euthyroid\par -repeat thyroid US in August 2023\par \par 2. Prediabetes\par -A1c was 6.3% checked by PCP\par -on food recall, diet is high in carbohydrates/starches. Encouraged carb limits\par -prediabetes can be managed by PCP.  Educated that should she develop Type 2 diabetes, can return sooner\par \par Annual endocrine follow up, sooner if needed
Campbell, NP
Rn
Myself

## 2022-08-30 NOTE — HISTORY OF PRESENT ILLNESS
[FreeTextEntry1] : Patient is a 55 yo woman establishing endocrine care for thyroid nodules and prediabetes\par \par Patient reports having thyroid nodules diagnosed around 2019.  On physical exam, PCP felt that the thyroid was enlarged.  She was referred to Dr. Alexia Croft who checked thyroid US.  Was told the thyroid nodule sizes are okay.  PCP referred her here. She feels that she gets fatigued and some stress.\par Denies problems with eating, no compressive symptoms\par No family hx of thyroid disease\par No exposures to lithium/amiodarone/radiation  \par \par 8/12/22 (compared to May 2022)\par Right upper pole nodule solid 0.6 x 0.4 x 0.7 cm (0.6 x 0.4 x 0.5 cm)\par Left upper pole nodule solid 0.5 x 0.4 x 0.4 cm  (0.4 x 0.4 x 0.2 cm)\par Left upper pole complex nodule 0.4 x 0.3 x 0.4 cm (0.4 x 0.2 x 0.2 colloid)\par \par Prediabetes for several years\par Breakfast: dry cereal, hashed browns and corn meal\par Lunch: rice \par Dinner: rice\par Makes most foods at home\par No soda, no juice\par

## 2022-08-30 NOTE — REVIEW OF SYSTEMS
[Fatigue] : fatigue [Decreased Appetite] : appetite not decreased [Visual Field Defect] : no visual field defect [Dysphagia] : no dysphagia [Dysphonia] : no dysphonia [Chest Pain] : no chest pain [Slow Heart Rate] : heart rate is not slow [Palpitations] : no palpitations [Fast Heart Rate] : heart rate is not fast [Shortness Of Breath] : no shortness of breath [Cough] : no cough [Nausea] : no nausea [Constipation] : no constipation [Vomiting] : no vomiting [Headaches] : no headaches [Tremors] : no tremors [Depression] : no depression

## 2022-08-30 NOTE — PHYSICAL EXAM
[Thyroid Not Enlarged] : the thyroid was not enlarged [No Respiratory Distress] : no respiratory distress [No Accessory Muscle Use] : no accessory muscle use [Normal Rate and Effort] : normal respiratory rate and effort [Clear to Auscultation] : lungs were clear to auscultation bilaterally [Normal S1, S2] : normal S1 and S2 [Normal Rate] : heart rate was normal [Normal Bowel Sounds] : normal bowel sounds [Not Tender] : non-tender [Soft] : abdomen soft [No Stigmata of Cushings Syndrome] : no stigmata of Cushings Syndrome [Normal Gait] : normal gait [No Motor Deficits] : the motor exam was normal [Normal Reflexes] : deep tendon reflexes were 2+ and symmetric [No Tremors] : no tremors [Oriented x3] : oriented to person, place, and time [Normal Affect] : the affect was normal [Normal Insight/Judgement] : insight and judgment were intact [Normal Mood] : the mood was normal

## 2022-08-30 NOTE — CONSULT LETTER
[Dear  ___] : Dear  [unfilled], [Consult Letter:] : I had the pleasure of evaluating your patient, [unfilled]. [Please see my note below.] : Please see my note below. [Consult Closing:] : Thank you very much for allowing me to participate in the care of this patient.  If you have any questions, please do not hesitate to contact me. [Sincerely,] : Sincerely, [FreeTextEntry3] : Candice Villalba MD

## 2022-09-01 ENCOUNTER — APPOINTMENT (OUTPATIENT)
Dept: PULMONOLOGY | Facility: CLINIC | Age: 57
End: 2022-09-01

## 2022-09-01 DIAGNOSIS — R06.83 SNORING: ICD-10-CM

## 2022-09-01 DIAGNOSIS — G47.33 OBSTRUCTIVE SLEEP APNEA (ADULT) (PEDIATRIC): ICD-10-CM

## 2022-09-01 PROCEDURE — 99203 OFFICE O/P NEW LOW 30 MIN: CPT | Mod: 95

## 2022-09-01 NOTE — REASON FOR VISIT
[Home] : at home, [unfilled] , at the time of the visit. [Medical Office: ([facility])___] : at the medical office located in  [Spouse] : spouse

## 2022-09-01 NOTE — PROCEDURE
[FreeTextEntry1] : unattended home sleep testing done 8/1/22:  Mild (AHI=6.1, CMS 4%; AHI=13.3, AASM 3%) obstructive sleep apnea was observed with a jones oxygen saturation of 87% and less than 1% of study time spent below an oxygen saturation of 88%.\par

## 2022-09-01 NOTE — HISTORY OF PRESENT ILLNESS
[FreeTextEntry1] : 9/1/22: Initial visit for this 56-year-old woman for sleep disordered breathing.  Her  complains that she is a severe snorer which disturbs his sleep, but he has not noted apnea.  She generally goes to bed at 1130, sleep latency is less than 5 minutes, she does not typically awaken during the night before getting out of bed between 530 and 6 AM.  She generally feels rested.  She does note daytime sleepiness, for example watching television in the evenings.  There is no history of morning headache, parasomnia, or limb movements.  She has gained about 10 pounds over the past 2 years.\par \par She tells me she has some chronic left-sided nasal blockage.  She is followed by Dr. Croft for multinodular goiter.  She has some gastroesophageal reflux.  She has a history of some temporomandibular joint problems for which she may need an oral appliance.

## 2022-09-01 NOTE — PHYSICAL EXAM
[General Appearance - Well Developed] : well developed [Normal Appearance] : normal appearance [Well Groomed] : well groomed [General Appearance - Well Nourished] : well nourished [No Deformities] : no deformities [General Appearance - In No Acute Distress] : no acute distress [] : no respiratory distress

## 2022-09-01 NOTE — ASSESSMENT
[FreeTextEntry1] : Snoring with mild obstructive sleep apnea\par \par Relatively mild sleep disordered breathing, mostly symptoms are snoring. Treatment options for sleep disordered breathing were discussed.  The most rapid and successful treatment remains nasal CPAP or BilevelPAP.  Alternatives include upper airway surgery such as uvulopharyngoplasty or a dental appliance (better for milder cases).  Recently hypoglossal nerve stimulation has been used.  Positional therapy (avoidance of supine posture) can be helpful, and all patients should try to maintain a healthy weight and avoid alcohol or other sedating medications close to bedtime (she tells me she doesn't drink alcohol). \par \par I think she should first look into an oral appliance (mandibular advancer) for obstructive sleep apnea which might also help her symptoms of temporomandibular joint problems.    I have suggested if this is not felt to be a good option by her dentist she return for further consideration. Any ENT options for improving her nasal patency could be helpful.

## 2023-07-24 ENCOUNTER — NON-APPOINTMENT (OUTPATIENT)
Age: 58
End: 2023-07-24

## 2023-07-25 ENCOUNTER — APPOINTMENT (OUTPATIENT)
Dept: BREAST CENTER | Facility: CLINIC | Age: 58
End: 2023-07-25

## 2023-08-29 ENCOUNTER — NON-APPOINTMENT (OUTPATIENT)
Age: 58
End: 2023-08-29

## 2023-08-29 ENCOUNTER — APPOINTMENT (OUTPATIENT)
Dept: BREAST CENTER | Facility: CLINIC | Age: 58
End: 2023-08-29
Payer: COMMERCIAL

## 2023-08-29 VITALS
BODY MASS INDEX: 28.34 KG/M2 | HEART RATE: 76 BPM | HEIGHT: 64 IN | WEIGHT: 166 LBS | SYSTOLIC BLOOD PRESSURE: 131 MMHG | DIASTOLIC BLOOD PRESSURE: 88 MMHG

## 2023-08-29 DIAGNOSIS — N64.4 MASTODYNIA: ICD-10-CM

## 2023-08-29 DIAGNOSIS — Z80.3 FAMILY HISTORY OF MALIGNANT NEOPLASM OF BREAST: ICD-10-CM

## 2023-08-29 DIAGNOSIS — R92.8 OTHER ABNORMAL AND INCONCLUSIVE FINDINGS ON DIAGNOSTIC IMAGING OF BREAST: ICD-10-CM

## 2023-08-29 PROCEDURE — 99204 OFFICE O/P NEW MOD 45 MIN: CPT

## 2023-08-29 NOTE — PAST MEDICAL HISTORY
[Menarche Age ____] : age at menarche was [unfilled] [Menopause Age____] : age at menopause was [unfilled] [Total Preg ___] : G[unfilled] [Live Births ___] : P[unfilled]  [Age At Live Birth ___] : Age at live birth: [unfilled] [History of Hormone Replacement Treatment] : has no history of hormone replacement treatment [FreeTextEntry6] : NO [FreeTextEntry7] : YES [FreeTextEntry8] : YES

## 2023-08-29 NOTE — CONSULT LETTER
[Dear  ___] : Dear ~MAYITO, [Consult Letter:] : I had the pleasure of evaluating your patient, [unfilled]. [Please see my note below.] : Please see my note below. [Consult Closing:] : Thank you very much for allowing me to participate in the care of this patient.  If you have any questions, please do not hesitate to contact me. [Sincerely,] : Sincerely, [FreeTextEntry2] : Dr. Dodd [FreeTextEntry3] : Scott Gauthier MD Chief of Breast Surgery Director of Breast Cancer Program [facility]

## 2023-08-29 NOTE — DATA REVIEWED
[FreeTextEntry1] : 8/23/2023 B/L sMMG & US ([facility])- scattered areas of fbg density. Multiple prominent left axillary lymph nodes are noted, increased in size from prior studies. FOLLOW-UP: Additional targeted US evaluation is recommended. BIRADS-0: Incomplete

## 2023-08-29 NOTE — PHYSICAL EXAM
[Supple] : supple [No Supraclavicular Adenopathy] : no supraclavicular adenopathy [Examined in the supine and seated position] : examined in the supine and seated position [No dominant masses] : no dominant masses in right breast  [No dominant masses] : no dominant masses left breast [No Nipple Retraction] : no left nipple retraction [No Nipple Discharge] : no left nipple discharge [de-identified] : accessory breast tissue noted

## 2023-08-29 NOTE — ASSESSMENT
[FreeTextEntry1] : 57 year presents with  for initial evaluation for left breast pain that starts from left axilla and radiates to breast; first noted by patient 5 years ago, states this comes and goes, recently more often and more painful. Discussed with the patient that mastalgia is not a common sign of breast cancer. I reviewed wearing a sports bra, and trial of OTC evening primrose oil (high dose EPO handout was provided).  JESSY 15.2%. Physical exam reveals L accessory breast tissue. B/L sMMG & US 8/23/2023 BIRADS-0 revealed multiple prominent left axillary lymph nodes, increased in size from prior studies. Plan for targeted L US within 1-2 weeks, and if benign patient to return in 3 months for re-examination after trial of EPO. Patient verbalizes understanding and is in agreement with the plan.

## 2023-08-29 NOTE — HISTORY OF PRESENT ILLNESS
[FreeTextEntry1] : FABRICE is a  57 year referred by Dr. Dodd presents with  for initial evaluation for left breast pain that starts from left axilla and radiates to breast; first noted by patient 5 years ago, states this comes and goes, recently more often and more painful.  Patient denies known cardiac health problems. Denies nipple discharge, nipple/breast skin changes or dimpling. Denies fever and chills. Denies prior breast surgeries or biopsies.   B/L sMMG & US 8/23/2023 BIRADS-0 revealed multiple prominent left axillary lymph nodes, increased in size from prior studies, with recommendation for targeted L US evaluation. Patient had the shingles vaccine 2-3 weeks ago, in the left arm, however patient states she felt the lymph nodes prior to this.   Patient reports family history of breast cancer in sister at age 50. Denies famhx of ovarian cancer. JESSY 15.2%

## 2023-09-15 ENCOUNTER — NON-APPOINTMENT (OUTPATIENT)
Age: 58
End: 2023-09-15

## 2023-10-09 ENCOUNTER — NON-APPOINTMENT (OUTPATIENT)
Age: 58
End: 2023-10-09

## 2023-10-11 ENCOUNTER — NON-APPOINTMENT (OUTPATIENT)
Age: 58
End: 2023-10-11

## 2023-11-21 ENCOUNTER — APPOINTMENT (OUTPATIENT)
Dept: BREAST CENTER | Facility: CLINIC | Age: 58
End: 2023-11-21

## 2024-07-19 ENCOUNTER — EMERGENCY (EMERGENCY)
Facility: HOSPITAL | Age: 59
LOS: 1 days | Discharge: HOME | End: 2024-07-19
Admitting: STUDENT IN AN ORGANIZED HEALTH CARE EDUCATION/TRAINING PROGRAM
Payer: COMMERCIAL

## 2024-07-19 VITALS
OXYGEN SATURATION: 97 % | RESPIRATION RATE: 16 BRPM | SYSTOLIC BLOOD PRESSURE: 149 MMHG | HEIGHT: 64 IN | DIASTOLIC BLOOD PRESSURE: 78 MMHG | HEART RATE: 87 BPM | WEIGHT: 169.98 LBS | TEMPERATURE: 98 F

## 2024-07-19 DIAGNOSIS — M54.40 LUMBAGO WITH SCIATICA, UNSPECIFIED SIDE: ICD-10-CM

## 2024-07-19 DIAGNOSIS — M54.50 LOW BACK PAIN, UNSPECIFIED: ICD-10-CM

## 2024-07-19 PROCEDURE — 99284 EMERGENCY DEPT VISIT MOD MDM: CPT

## 2024-07-19 RX ORDER — KETOROLAC TROMETHAMINE 30 MG/ML
15 INJECTION, SOLUTION INTRAMUSCULAR ONCE
Refills: 0 | Status: DISCONTINUED | OUTPATIENT
Start: 2024-07-19 | End: 2024-07-19

## 2024-07-19 RX ORDER — LIDOCAINE HCL 28 MG/G
1 GEL TOPICAL ONCE
Refills: 0 | Status: COMPLETED | OUTPATIENT
Start: 2024-07-19 | End: 2024-07-19

## 2024-07-19 RX ORDER — METHOCARBAMOL 500 MG
750 TABLET ORAL ONCE
Refills: 0 | Status: COMPLETED | OUTPATIENT
Start: 2024-07-19 | End: 2024-07-19

## 2024-07-19 RX ADMIN — Medication 750 MILLIGRAM(S): at 23:56

## 2024-07-19 RX ADMIN — KETOROLAC TROMETHAMINE 15 MILLIGRAM(S): 30 INJECTION, SOLUTION INTRAMUSCULAR at 23:56

## 2024-07-19 NOTE — ED ADULT NURSE NOTE - NSFALLUNIVINTERV_ED_ALL_ED
Bed/Stretcher in lowest position, wheels locked, appropriate side rails in place/Call bell, personal items and telephone in reach/Instruct patient to call for assistance before getting out of bed/chair/stretcher/Non-slip footwear applied when patient is off stretcher/[place] to call system/Physically safe environment - no spills, clutter or unnecessary equipment/Purposeful proactive rounding/Room/bathroom lighting operational, light cord in reach

## 2024-07-19 NOTE — ED ADULT NURSE NOTE - DRUG PRE-SCREENING (DAST -1)
Statement Selected
- DNR/DNI  - MOLST form in chart  -Appreciate palliative care consult. Patient transitioned to comfort care only pending bed availability for inpatient hospice
- DNR/DNI  - MOLST form in chart

## 2024-07-19 NOTE — ED ADULT TRIAGE NOTE - ARRIVAL INFO ADDITIONAL COMMENTS
Denies trauma to neck or head. Denies leg weakness, tingling, and numbness. Denies bowel or bladder dysfunction.

## 2024-07-19 NOTE — ED ADULT NURSE NOTE - OBJECTIVE STATEMENT
59 yo F c/o bilateral hip pain that radiates down both her legs. Pt reports this pain started last week. Previously she has been told that it was sciatica. Alert and oriented, ambulatory independently. Denies trauma.

## 2024-07-20 PROCEDURE — 99283 EMERGENCY DEPT VISIT LOW MDM: CPT | Mod: 25

## 2024-07-20 PROCEDURE — 96372 THER/PROPH/DIAG INJ SC/IM: CPT

## 2024-07-20 RX ORDER — NAPROXEN SODIUM 550 MG
1 TABLET ORAL
Qty: 14 | Refills: 0
Start: 2024-07-20 | End: 2024-07-26

## 2024-07-20 RX ORDER — METHOCARBAMOL 500 MG
1 TABLET ORAL
Qty: 21 | Refills: 0
Start: 2024-07-20 | End: 2024-07-26

## 2024-07-20 RX ORDER — TRAMADOL HYDROCHLORIDE 50 MG/1
1 TABLET, FILM COATED ORAL
Qty: 14 | Refills: 0
Start: 2024-07-20 | End: 2024-07-26

## 2024-07-20 RX ADMIN — LIDOCAINE HCL 1 PATCH: 28 GEL TOPICAL at 01:00

## 2024-07-20 NOTE — ED PROVIDER NOTE - NSFOLLOWUPINSTRUCTIONS_ED_ALL_ED_FT
Thank you for visiting [facility] Emergency Department.    We saw you today for lower bck pain  Please know that no emergency visit is complete without follow-up with your primary care provider within 1 week.    Please continue taking all prescribed medications as instructed  I appreciated your patience and hope you feel better soon.   Return to ER immediately if you develop fevers, chills, chest pain, shortness of breath, worsening of back pain, weakness to your extremities dizziness, and/or any concerning symptoms.

## 2024-07-20 NOTE — ED PROVIDER NOTE - CLINICAL SUMMARY MEDICAL DECISION MAKING FREE TEXT BOX
57 yo female in the Er c/o lower back pain x 2 weeks. Pt states pain gradually became worse. Pt c/o pain radiating down her posterior legs , worse with movement. Pt denies numbness or weakness to her legs, denies abdominal pain, pelvic pain, dysuria, hematuria, diarrhea, constipation., denies any injury to her lower back or hip joints.   Pt ambulatory and non-toxic appearing. Afebrile, VSS. no focal neuro deficits on exam, benign abdomen, no systemic signs of infection, no skin rash, no flank pain. Unlikely any acute bony injury since pt had no fall or trauma. Plan Pain control with NSAIds, muscle relaxants, pain control, re-evaluate.  anticipate d/c home for further out pt care.

## 2024-07-20 NOTE — ED PROVIDER NOTE - MUSCULOSKELETAL, MLM
Spine and all extremities grossly appears normal, range of motion is not limited,  diffuse tenderness to paraspinal muscles of  lumbar region, no step off sign, no localized tenderness over the l-spine

## 2024-07-20 NOTE — ED PROVIDER NOTE - PATIENT PORTAL LINK FT
You can access the FollowMyHealth Patient Portal offered by [facility] by registering at the following website: [email or web address]. By joining [employer]’s FollowMyHealth portal, you will also be able to view your health information using other applications (apps) compatible with our system.

## 2024-07-20 NOTE — ED PROVIDER NOTE - OBJECTIVE STATEMENT
59 yo female in the Er c/o lower back pain x 2 weeks. Pt states pain gradually became worse. Pt c/o pain radiating down her posterior legs , worse with movement. Pt denies numbness or weakness to her legs, denies abdominal pain, pelvic pain, dysuria, hematuria, diarrhea, constipation., denies any injury to her lower back or hip joints.

## 2024-09-02 NOTE — HISTORY OF PRESENT ILLNESS
[de-identified] : I was pleased to evaluate this 53 year old woman who was referred by Dr. Dodd for thyroid problem.\par She was accompanied by her , who also contributed to history. \par \par Ms. Mathis was noted on routine physical examination to have thyroid enlargement.\par She had subsequent thyroid US.\par She denies neck pain/pressure, trouble swallowing or voice change.\par Has occasional FB sensation in throat and persistent dry cough after URI in April, treated with benzonatate and levofloxacin.  She has no heartburn and feels no postnasal drip.  Denies allergic rhinitis.\par No FH of thyroid problems.\par No radiation other than routine imaging.\par She reports that thyroid blood tests were normal.\par \par \par US THYROID (03-) at [facility]:\par - COMPARISON:  None. \par - ISTHMUS:  0.4 cm.\par - RIGHT LOBE:  5.2 cm x 1.4 cm  x 2.2 cm, slightly heterogeneous without discrete nodule. \par - LEFT LOBE:   4.9 cm  x 1.8 cm x 2.0 cm \par    --  0.5 x 0.3 x 0.5 cm complex cystic and solid nodule in the upper pole\par    --  0.4 cm cyst in the midpole\par \par  MED